# Patient Record
Sex: MALE | Race: WHITE | HISPANIC OR LATINO
[De-identification: names, ages, dates, MRNs, and addresses within clinical notes are randomized per-mention and may not be internally consistent; named-entity substitution may affect disease eponyms.]

---

## 2020-08-25 ENCOUNTER — TRANSCRIPTION ENCOUNTER (OUTPATIENT)
Age: 35
End: 2020-08-25

## 2020-08-26 ENCOUNTER — INPATIENT (INPATIENT)
Facility: HOSPITAL | Age: 35
LOS: 0 days | Discharge: HOME | DRG: 343 | End: 2020-08-27
Attending: SURGERY | Admitting: SURGERY
Payer: MEDICAID

## 2020-08-26 ENCOUNTER — RESULT REVIEW (OUTPATIENT)
Age: 35
End: 2020-08-26

## 2020-08-26 VITALS
DIASTOLIC BLOOD PRESSURE: 82 MMHG | RESPIRATION RATE: 18 BRPM | SYSTOLIC BLOOD PRESSURE: 143 MMHG | OXYGEN SATURATION: 99 % | WEIGHT: 168.43 LBS | HEIGHT: 68 IN | HEART RATE: 68 BPM | TEMPERATURE: 99 F

## 2020-08-26 LAB
ALBUMIN SERPL ELPH-MCNC: 5 G/DL — SIGNIFICANT CHANGE UP (ref 3.3–5)
ALP SERPL-CCNC: 88 U/L — SIGNIFICANT CHANGE UP (ref 40–120)
ALT FLD-CCNC: 12 U/L — SIGNIFICANT CHANGE UP (ref 10–45)
ANION GAP SERPL CALC-SCNC: 10 MMOL/L — SIGNIFICANT CHANGE UP (ref 5–17)
APTT BLD: 33.1 SEC — SIGNIFICANT CHANGE UP (ref 27.5–35.5)
AST SERPL-CCNC: 20 U/L — SIGNIFICANT CHANGE UP (ref 10–40)
BASOPHILS # BLD AUTO: 0.02 K/UL — SIGNIFICANT CHANGE UP (ref 0–0.2)
BASOPHILS NFR BLD AUTO: 0.2 % — SIGNIFICANT CHANGE UP (ref 0–2)
BILIRUB SERPL-MCNC: 0.7 MG/DL — SIGNIFICANT CHANGE UP (ref 0.2–1.2)
BLD GP AB SCN SERPL QL: NEGATIVE — SIGNIFICANT CHANGE UP
BUN SERPL-MCNC: 18 MG/DL — SIGNIFICANT CHANGE UP (ref 7–23)
CALCIUM SERPL-MCNC: 9.3 MG/DL — SIGNIFICANT CHANGE UP (ref 8.4–10.5)
CHLORIDE SERPL-SCNC: 103 MMOL/L — SIGNIFICANT CHANGE UP (ref 96–108)
CO2 SERPL-SCNC: 24 MMOL/L — SIGNIFICANT CHANGE UP (ref 22–31)
CREAT SERPL-MCNC: 0.76 MG/DL — SIGNIFICANT CHANGE UP (ref 0.5–1.3)
EOSINOPHIL # BLD AUTO: 0.01 K/UL — SIGNIFICANT CHANGE UP (ref 0–0.5)
EOSINOPHIL NFR BLD AUTO: 0.1 % — SIGNIFICANT CHANGE UP (ref 0–6)
GLUCOSE SERPL-MCNC: 136 MG/DL — HIGH (ref 70–99)
HCT VFR BLD CALC: 44.6 % — SIGNIFICANT CHANGE UP (ref 39–50)
HGB BLD-MCNC: 14.5 G/DL — SIGNIFICANT CHANGE UP (ref 13–17)
IMM GRANULOCYTES NFR BLD AUTO: 0.3 % — SIGNIFICANT CHANGE UP (ref 0–1.5)
INR BLD: 0.99 — SIGNIFICANT CHANGE UP (ref 0.88–1.16)
LIDOCAIN IGE QN: 16 U/L — SIGNIFICANT CHANGE UP (ref 7–60)
LYMPHOCYTES # BLD AUTO: 0.85 K/UL — LOW (ref 1–3.3)
LYMPHOCYTES # BLD AUTO: 6.6 % — LOW (ref 13–44)
MCHC RBC-ENTMCNC: 29.7 PG — SIGNIFICANT CHANGE UP (ref 27–34)
MCHC RBC-ENTMCNC: 32.5 GM/DL — SIGNIFICANT CHANGE UP (ref 32–36)
MCV RBC AUTO: 91.4 FL — SIGNIFICANT CHANGE UP (ref 80–100)
MONOCYTES # BLD AUTO: 0.57 K/UL — SIGNIFICANT CHANGE UP (ref 0–0.9)
MONOCYTES NFR BLD AUTO: 4.4 % — SIGNIFICANT CHANGE UP (ref 2–14)
NEUTROPHILS # BLD AUTO: 11.34 K/UL — HIGH (ref 1.8–7.4)
NEUTROPHILS NFR BLD AUTO: 88.4 % — HIGH (ref 43–77)
NRBC # BLD: 0 /100 WBCS — SIGNIFICANT CHANGE UP (ref 0–0)
PLATELET # BLD AUTO: 262 K/UL — SIGNIFICANT CHANGE UP (ref 150–400)
POTASSIUM SERPL-MCNC: 4.1 MMOL/L — SIGNIFICANT CHANGE UP (ref 3.5–5.3)
POTASSIUM SERPL-SCNC: 4.1 MMOL/L — SIGNIFICANT CHANGE UP (ref 3.5–5.3)
PROT SERPL-MCNC: 7.9 G/DL — SIGNIFICANT CHANGE UP (ref 6–8.3)
PROTHROM AB SERPL-ACNC: 11.9 SEC — SIGNIFICANT CHANGE UP (ref 10.6–13.6)
RBC # BLD: 4.88 M/UL — SIGNIFICANT CHANGE UP (ref 4.2–5.8)
RBC # FLD: 12.5 % — SIGNIFICANT CHANGE UP (ref 10.3–14.5)
RH IG SCN BLD-IMP: POSITIVE — SIGNIFICANT CHANGE UP
SARS-COV-2 RNA SPEC QL NAA+PROBE: SIGNIFICANT CHANGE UP
SODIUM SERPL-SCNC: 137 MMOL/L — SIGNIFICANT CHANGE UP (ref 135–145)
WBC # BLD: 12.83 K/UL — HIGH (ref 3.8–10.5)
WBC # FLD AUTO: 12.83 K/UL — HIGH (ref 3.8–10.5)

## 2020-08-26 PROCEDURE — 44970 LAPAROSCOPY APPENDECTOMY: CPT | Mod: GC

## 2020-08-26 PROCEDURE — 74177 CT ABD & PELVIS W/CONTRAST: CPT | Mod: 26

## 2020-08-26 PROCEDURE — 99232 SBSQ HOSP IP/OBS MODERATE 35: CPT | Mod: GC,57

## 2020-08-26 PROCEDURE — 88304 TISSUE EXAM BY PATHOLOGIST: CPT | Mod: 26

## 2020-08-26 PROCEDURE — 99285 EMERGENCY DEPT VISIT HI MDM: CPT

## 2020-08-26 RX ORDER — IOHEXOL 300 MG/ML
30 INJECTION, SOLUTION INTRAVENOUS ONCE
Refills: 0 | Status: COMPLETED | OUTPATIENT
Start: 2020-08-26 | End: 2020-08-26

## 2020-08-26 RX ORDER — MORPHINE SULFATE 50 MG/1
4 CAPSULE, EXTENDED RELEASE ORAL ONCE
Refills: 0 | Status: DISCONTINUED | OUTPATIENT
Start: 2020-08-26 | End: 2020-08-26

## 2020-08-26 RX ORDER — KETOROLAC TROMETHAMINE 30 MG/ML
15 SYRINGE (ML) INJECTION EVERY 6 HOURS
Refills: 0 | Status: DISCONTINUED | OUTPATIENT
Start: 2020-08-26 | End: 2020-08-27

## 2020-08-26 RX ORDER — HYDROMORPHONE HYDROCHLORIDE 2 MG/ML
0.5 INJECTION INTRAMUSCULAR; INTRAVENOUS; SUBCUTANEOUS EVERY 4 HOURS
Refills: 0 | Status: DISCONTINUED | OUTPATIENT
Start: 2020-08-26 | End: 2020-08-27

## 2020-08-26 RX ORDER — ACETAMINOPHEN 500 MG
650 TABLET ORAL EVERY 6 HOURS
Refills: 0 | Status: DISCONTINUED | OUTPATIENT
Start: 2020-08-26 | End: 2020-08-27

## 2020-08-26 RX ORDER — SODIUM CHLORIDE 9 MG/ML
1000 INJECTION INTRAMUSCULAR; INTRAVENOUS; SUBCUTANEOUS ONCE
Refills: 0 | Status: COMPLETED | OUTPATIENT
Start: 2020-08-26 | End: 2020-08-26

## 2020-08-26 RX ORDER — PIPERACILLIN AND TAZOBACTAM 4; .5 G/20ML; G/20ML
3.38 INJECTION, POWDER, LYOPHILIZED, FOR SOLUTION INTRAVENOUS ONCE
Refills: 0 | Status: DISCONTINUED | OUTPATIENT
Start: 2020-08-26 | End: 2020-08-26

## 2020-08-26 RX ORDER — ONDANSETRON 8 MG/1
4 TABLET, FILM COATED ORAL EVERY 6 HOURS
Refills: 0 | Status: DISCONTINUED | OUTPATIENT
Start: 2020-08-26 | End: 2020-08-27

## 2020-08-26 RX ORDER — CEFTRIAXONE 500 MG/1
1000 INJECTION, POWDER, FOR SOLUTION INTRAMUSCULAR; INTRAVENOUS ONCE
Refills: 0 | Status: COMPLETED | OUTPATIENT
Start: 2020-08-26 | End: 2020-08-26

## 2020-08-26 RX ORDER — METRONIDAZOLE 500 MG
500 TABLET ORAL EVERY 8 HOURS
Refills: 0 | Status: DISCONTINUED | OUTPATIENT
Start: 2020-08-26 | End: 2020-08-26

## 2020-08-26 RX ORDER — ENOXAPARIN SODIUM 100 MG/ML
40 INJECTION SUBCUTANEOUS EVERY 24 HOURS
Refills: 0 | Status: DISCONTINUED | OUTPATIENT
Start: 2020-08-26 | End: 2020-08-27

## 2020-08-26 RX ORDER — METRONIDAZOLE 500 MG
500 TABLET ORAL ONCE
Refills: 0 | Status: COMPLETED | OUTPATIENT
Start: 2020-08-26 | End: 2020-08-26

## 2020-08-26 RX ORDER — ONDANSETRON 8 MG/1
4 TABLET, FILM COATED ORAL ONCE
Refills: 0 | Status: COMPLETED | OUTPATIENT
Start: 2020-08-26 | End: 2020-08-26

## 2020-08-26 RX ORDER — FAMOTIDINE 10 MG/ML
20 INJECTION INTRAVENOUS ONCE
Refills: 0 | Status: COMPLETED | OUTPATIENT
Start: 2020-08-26 | End: 2020-08-26

## 2020-08-26 RX ORDER — SODIUM CHLORIDE 9 MG/ML
1000 INJECTION INTRAMUSCULAR; INTRAVENOUS; SUBCUTANEOUS
Refills: 0 | Status: DISCONTINUED | OUTPATIENT
Start: 2020-08-26 | End: 2020-08-27

## 2020-08-26 RX ADMIN — FAMOTIDINE 20 MILLIGRAM(S): 10 INJECTION INTRAVENOUS at 08:22

## 2020-08-26 RX ADMIN — SODIUM CHLORIDE 1000 MILLILITER(S): 9 INJECTION INTRAMUSCULAR; INTRAVENOUS; SUBCUTANEOUS at 11:18

## 2020-08-26 RX ADMIN — Medication 100 MILLIGRAM(S): at 11:19

## 2020-08-26 RX ADMIN — IOHEXOL 30 MILLILITER(S): 300 INJECTION, SOLUTION INTRAVENOUS at 08:59

## 2020-08-26 RX ADMIN — MORPHINE SULFATE 4 MILLIGRAM(S): 50 CAPSULE, EXTENDED RELEASE ORAL at 09:17

## 2020-08-26 RX ADMIN — CEFTRIAXONE 100 MILLIGRAM(S): 500 INJECTION, POWDER, FOR SOLUTION INTRAMUSCULAR; INTRAVENOUS at 11:19

## 2020-08-26 RX ADMIN — ONDANSETRON 4 MILLIGRAM(S): 8 TABLET, FILM COATED ORAL at 09:17

## 2020-08-26 RX ADMIN — Medication 15 MILLIGRAM(S): at 21:35

## 2020-08-26 RX ADMIN — Medication 15 MILLIGRAM(S): at 21:25

## 2020-08-26 RX ADMIN — MORPHINE SULFATE 4 MILLIGRAM(S): 50 CAPSULE, EXTENDED RELEASE ORAL at 10:15

## 2020-08-26 RX ADMIN — SODIUM CHLORIDE 1000 MILLILITER(S): 9 INJECTION INTRAMUSCULAR; INTRAVENOUS; SUBCUTANEOUS at 08:21

## 2020-08-26 NOTE — ED PROVIDER NOTE - OBJECTIVE STATEMENT
35 year old male with no past medical history on no medication presents to ED with concern for periumbilical abdominal discomfort x 24 hours.  Patient notes associated nausea without emesis.  No fever, chills, chest pain, shortness of breath, changes to bowel movements (last BM this morning and normal per patient), rashes, recent travel, known sick contacts or any additional acute complaints or concerns at this time.  He has not taken any medication for his symptoms prior to arrival in ED today.

## 2020-08-26 NOTE — H&P ADULT - ASSESSMENT
36 y/o M, no PMH, periumbilical pain x1d, vomiting x2, nausea, no f/c  WBC 12, CT scan showed appendicitis (non complicated)    Tender in RLQ, afebrile, received Rocephin and Flagyl by ED  Covid 19 is negative    Discussed with Chief resident and attending on call and explained the treatment options to the patient using interpretation and the patient is agreeable to surgery    Plan:    - Admit to regional  - NPO, IV fluids, SCD, Lovenox, ABX  - Typed and screened  - Booked for surgery for lap appendectomy

## 2020-08-26 NOTE — PACU DISCHARGE NOTE - COMMENTS
for transfer to [facility] bed 1, Pt AOx4, VSS,afebrile.02sat 99% on 2 LNC.  No sign of distress ir SOB noted. No c/o pain. Sx: laparoscopic appendectomy with 3 x lap site, no bleeding noted. IVL patent and intact, no infiltration or phlebitis noted. Report given to BRUNA Rea for transfer to [facility] bed 1, Pt AOx4, VSS,afebrile.02sat 99% on 2 LNC.  No sign of distress ir SOB noted. No c/o pain. Sx: laparoscopic appendectomy with 3 x lap site, no bleeding noted. IVL patent and intact, no infiltration or phlebitis noted. Report given to BRUNA Aamto

## 2020-08-26 NOTE — H&P ADULT - NSHPPHYSICALEXAM_GEN_ALL_CORE
Vital Signs Last 24 Hrs  T(C): 36.9 (26 Aug 2020 14:28), Max: 37.1 (26 Aug 2020 08:09)  T(F): 98.5 (26 Aug 2020 14:28), Max: 98.7 (26 Aug 2020 08:09)  HR: 61 (26 Aug 2020 14:28) (54 - 74)  BP: 147/82 (26 Aug 2020 14:28) (143/82 - 150/80)  BP(mean): --  RR: 17 (26 Aug 2020 14:28) (16 - 18)  SpO2: 99% (26 Aug 2020 14:28) (98% - 99%)    Gen: NAD, resting comfortably in bed  Pulm: Good inspiratory effort, nonlabored breathing  C/V: NSR  Abd: Soft, tender in RLQ, Rovsing is +ve nondistended. No rebound, no guarding.   Extrem: WWP, no edema

## 2020-08-26 NOTE — H&P ADULT - NSHPLABSRESULTS_GEN_ALL_CORE
14.5   12.83 )-----------( 262      ( 26 Aug 2020 08:26 )             44.6   08-26    137  |  103  |  18  ----------------------------<  136<H>  4.1   |  24  |  0.76    Ca    9.3      26 Aug 2020 08:26    TPro  7.9  /  Alb  5.0  /  TBili  0.7  /  DBili  x   /  AST  20  /  ALT  12  /  AlkPhos  88  08-26    COVID-19 PCR: NotDetec (26 Aug 2020 11:58)  < from: CT Abdomen and Pelvis w/ Oral Cont and w/ IV Cont (08.26.20 @ 10:34) >    FINDINGS:    Lower chest: Clear lung bases.    Liver: Smooth in contour. No focal lesion. Portal and hepatic veins are patent.    Biliary system: Gallbladder is normal in size. No calcified gallstones. No biliary ductal dilatation.    Pancreas: Unremarkable.    Spleen: Unremarkable.    Adrenal glands: Unremarkable.    Kidneys: Symmetric parenchymal enhancement. No renal mass. No hydronephrosis. No renal or ureteral stone.  Urinary Bladder: Unremarkable.    Reproductive organs: Unremarkable.    Bowel/Peritoneum: Dilated appendix 1.4 cm with periappendiceal stranding and 1.1 cm appendicolith at appendiceal neck. Appendix contains air bubble and debris. No adjacent organized collection. No pneumoperitoneum. Trace dependent pelvic free fluid, probably reactive. Normal caliber bowel without evidence of obstruction. Colonic diverticulosis. No appreciable wall thickening.    Lymph nodes: Few scattered mildly prominent right lower quadrant and retroperitoneal nodes, probably reactive. No lymphadenopathy.    Aorta/IVC: Normal caliber. No calcific atherosclerosis.    Abdominal wall: No hernia.    Bones/Soft tissues: No significant abnormality.      IMPRESSION:    1.  Acute appendicitis with obstructing appendicolith. No pneumoperitoneum or adjacent organized collection.  2.  Colonic diverticulosis.    < end of copied text >

## 2020-08-26 NOTE — H&P ADULT - ATTENDING COMMENTS
Patient seen and examined. History, physical examination and imaging are consistent with acute appendicitis. We discuss the risks, benefits and alternatives to laparoscopic appendectomy with the patient including but not limited to bleeding, infection, death, disability, chronic pain, numbness, abscess, leak, need for additional procedures, blood clots, death, bleeding, and other issues. Antibiotic therapy unlikely to work based on CT findings. Will proceed to operative room for procedure appendectomy. I answered all questions.

## 2020-08-26 NOTE — PROGRESS NOTE ADULT - SUBJECTIVE AND OBJECTIVE BOX
POST-OPERATIVE NOTE    Procedure: laparoscopic appendectomy    Diagnosis/Indication: acute appendicitis    Surgeon: Dr. Mercer    S: Pt has no complaints, lying comfortably in bed. He reports that he is having only a little pain. He had water and tolerated it well, denies nausea/vomiting. Denies CP, SOB, IVEY, calf tenderness. Pain controlled with medication.    O:  T(C): 37.1 (08-26-20 @ 20:44), Max: 37.1 (08-26-20 @ 20:44)  T(F): 98.7 (08-26-20 @ 20:44), Max: 98.7 (08-26-20 @ 20:44)  HR: 85 (08-26-20 @ 20:44) (64 - 85)  BP: 137/79 (08-26-20 @ 20:44) (117/63 - 137/79)  RR: 18 (08-26-20 @ 20:44) (17 - 21)  SpO2: 97% (08-26-20 @ 20:44) (97% - 100%)  Wt(kg): --                        14.5   12.83 )-----------( 262      ( 26 Aug 2020 08:26 )             44.6     08-26    137  |  103  |  18  ----------------------------<  136<H>  4.1   |  24  |  0.76    Ca    9.3      26 Aug 2020 08:26    TPro  7.9  /  Alb  5.0  /  TBili  0.7  /  DBili  x   /  AST  20  /  ALT  12  /  AlkPhos  88  08-26      Gen: NAD, resting comfortably in bed  C/V: NSR  Pulm: Nonlabored breathing, no respiratory distress  Abd: soft, ND, appropriately tender to palpation, mild pain   Incisions: clean/dry/intact, w dermabond  Extrem: WWP, no calf edema or tenderness, SCDs in place      A/P: 35y Male s/p above procedure  Diet: Regular  IVF: NS@110  Pain/nausea control  SQH/SCDs/OOBA/IS  Dispo pending pain control, PO tolerance, clinical improvement

## 2020-08-26 NOTE — BRIEF OPERATIVE NOTE - OPERATION/FINDINGS
Appendix identified (non-perforated, non-gangrenous). Cecum bluntly mobilized. Mesoappendix divided using electrocautery. Appendix amputated at base near cecum using EndoGIA stapler. Stump inspected laparoscopically. Fascia closed w/ Vicryl sutures. Appendix identified (non-perforated, inflamed). Cecum bluntly mobilized. Mesoappendix divided using electrocautery. Appendix amputated at base near cecum using EndoGIA stapler. Stump inspected laparoscopically. Irrigation performed. Fascia closed w/ Vicryl sutures. Skin closed with 4-O monocryl.

## 2020-08-26 NOTE — ED PROVIDER NOTE - CLINICAL SUMMARY MEDICAL DECISION MAKING FREE TEXT BOX
Patient presents to ED with concern for periumbilical pain.  Labs CT abd/pel reviewed.  Acute appendicitis detected.  Abx given, surgery consulted.  Surgery agreeable to admission to regional floor, Dr. Mercer.  Patient is aware of plan and in agreement.  Will admit at this time.

## 2020-08-26 NOTE — H&P ADULT - HISTORY OF PRESENT ILLNESS
This is a 36 y/o M, [place] speaking only, no significant PMH presented with periumbilical abdominal pain of 1 d duration, associated with nausea and vomiting x2, pain is severe, sharp and aggravated by movement, not shifting or radiating, denies fever or chills no recent weight loss, no change in bowel habits, no recent travel or sick contact.    PMH: denies  PSH: denies  FMH: denies  SH: denies tobacco and drugs,. admits to occasional drinking etoh  Allergies: NKDA

## 2020-08-26 NOTE — ED PROVIDER NOTE - PHYSICAL EXAMINATION
VITAL SIGNS: I have reviewed nursing notes and confirm.  CONSTITUTIONAL: Well-developed; well-nourished; in no acute distress.   SKIN:  warm and dry, no acute rash.   HEAD:  normocephalic, atraumatic.  EYES: PERRL.  EOM intact; conjunctiva and sclera clear.  ENT: No nasal discharge; airway clear.   NECK: Supple; non tender.  CARD: S1, S2 normal; no murmurs, gallops, or rubs. Regular rate and rhythm.   RESP:  Clear to auscultation b/l, no wheezes, rales or rhonchi.  ABD: Normal bowel sounds; soft; + mild periumbilical tenderness; soft; no guarding/rebound.  EXT: Normal ROM. No clubbing, cyanosis or edema. 2+ pulses to b/l ue/le.  NEURO: Alert, oriented, grossly unremarkable.  5/5 strength x 4 extremities against gravity and external force.  No drift x 4 extremities.  Sensation intact and symmetric x 4 extremities.  No facial asymmetry.    PSYCH: Cooperative, mood and affect appropriate.

## 2020-08-26 NOTE — ED PROVIDER NOTE - NS ED ROS FT
Constitutional: No fever or chills.   Eyes: No pain, blurry vision, or discharge.  ENMT: No hearing changes, pain, discharge or infections. No neck pain or stiffness.  Cardiac: No chest pain, SOB or edema. No chest pain with exertion.  Respiratory: No cough or respiratory distress. No hemoptysis. No history of asthma or RAD.  GI: + nausea,  no vomiting, diarrhea, + abdominal pain.  : No dysuria, frequency or burning.  MS: No myalgia, muscle weakness, joint pain or back pain.  Neuro: No headache or weakness. No LOC.  Skin: No skin rash.   Endocrine: No history of thyroid disease or diabetes.  Except as documented in the HPI, all other systems are negative.

## 2020-08-26 NOTE — ED ADULT NURSE NOTE - CHPI ED NUR SYMPTOMS NEG
no abdominal distension/no burning urination/no chills/no diarrhea/no fever/no vomiting/no blood in stool/no hematuria/no dysuria

## 2020-08-27 ENCOUNTER — TRANSCRIPTION ENCOUNTER (OUTPATIENT)
Age: 35
End: 2020-08-27

## 2020-08-27 VITALS
DIASTOLIC BLOOD PRESSURE: 80 MMHG | HEART RATE: 71 BPM | SYSTOLIC BLOOD PRESSURE: 132 MMHG | OXYGEN SATURATION: 98 % | TEMPERATURE: 98 F | RESPIRATION RATE: 15 BRPM

## 2020-08-27 LAB
ANION GAP SERPL CALC-SCNC: 11 MMOL/L — SIGNIFICANT CHANGE UP (ref 5–17)
CALCIUM SERPL-MCNC: 8.5 MG/DL — SIGNIFICANT CHANGE UP (ref 8.4–10.5)
CHLORIDE SERPL-SCNC: 103 MMOL/L — SIGNIFICANT CHANGE UP (ref 96–108)
CO2 SERPL-SCNC: 26 MMOL/L — SIGNIFICANT CHANGE UP (ref 22–31)
CREAT SERPL-MCNC: 0.76 MG/DL — SIGNIFICANT CHANGE UP (ref 0.5–1.3)
GLUCOSE SERPL-MCNC: 118 MG/DL — HIGH (ref 70–99)
HCT VFR BLD CALC: 39.7 % — SIGNIFICANT CHANGE UP (ref 39–50)
HGB BLD-MCNC: 12.9 G/DL — LOW (ref 13–17)
MAGNESIUM SERPL-MCNC: 2.1 MG/DL — SIGNIFICANT CHANGE UP (ref 1.6–2.6)
MCHC RBC-ENTMCNC: 29.9 PG — SIGNIFICANT CHANGE UP (ref 27–34)
MCHC RBC-ENTMCNC: 32.5 GM/DL — SIGNIFICANT CHANGE UP (ref 32–36)
MCV RBC AUTO: 92.1 FL — SIGNIFICANT CHANGE UP (ref 80–100)
NRBC # BLD: 0 /100 WBCS — SIGNIFICANT CHANGE UP (ref 0–0)
PHOSPHATE SERPL-MCNC: 3.3 MG/DL — SIGNIFICANT CHANGE UP (ref 2.5–4.5)
PLATELET # BLD AUTO: 233 K/UL — SIGNIFICANT CHANGE UP (ref 150–400)
POTASSIUM SERPL-MCNC: 3.8 MMOL/L — SIGNIFICANT CHANGE UP (ref 3.5–5.3)
POTASSIUM SERPL-SCNC: 3.8 MMOL/L — SIGNIFICANT CHANGE UP (ref 3.5–5.3)
RBC # BLD: 4.31 M/UL — SIGNIFICANT CHANGE UP (ref 4.2–5.8)
RBC # FLD: 12.9 % — SIGNIFICANT CHANGE UP (ref 10.3–14.5)
SODIUM SERPL-SCNC: 140 MMOL/L — SIGNIFICANT CHANGE UP (ref 135–145)
WBC # BLD: 12.24 K/UL — HIGH (ref 3.8–10.5)
WBC # FLD AUTO: 12.24 K/UL — HIGH (ref 3.8–10.5)

## 2020-08-27 RX ORDER — ACETAMINOPHEN 500 MG
2 TABLET ORAL
Qty: 0 | Refills: 0 | DISCHARGE
Start: 2020-08-27

## 2020-08-27 RX ORDER — POTASSIUM CHLORIDE 20 MEQ
20 PACKET (EA) ORAL ONCE
Refills: 0 | Status: DISCONTINUED | OUTPATIENT
Start: 2020-08-27 | End: 2020-08-27

## 2020-08-27 RX ADMIN — Medication 15 MILLIGRAM(S): at 07:05

## 2020-08-27 RX ADMIN — Medication 15 MILLIGRAM(S): at 07:20

## 2020-08-27 NOTE — DISCHARGE NOTE PROVIDER - CARE PROVIDER_API CALL
Salvador Mercer  SURGERY  [address] [place] [address], [facility], [place] [id number]  Phone: [phone]  Fax: [phone]  Follow Up Time: 1 week

## 2020-08-27 NOTE — PROGRESS NOTE ADULT - ASSESSMENT
ASSESSMENT  35 year old Citizen of [place] speaking man now POD1 from noncomplicated lap appendectomy.    PLAN  -Follow ROBF; flatus but no bowel movement yet  -Follow pain control  -Advance diet as tolerated  -Not on antibiotics

## 2020-08-27 NOTE — DISCHARGE NOTE NURSING/CASE MANAGEMENT/SOCIAL WORK - PATIENT PORTAL LINK FT
You can access the FollowMyHealth Patient Portal offered by [facility] by registering at the following website: [email or web address]. By joining [facility]’s FollowMyHealth portal, you will also be able to view your health information using other applications (apps) compatible with our system.

## 2020-08-27 NOTE — PROGRESS NOTE ADULT - SUBJECTIVE AND OBJECTIVE BOX
STATUS POST:  POD1 from laparoscopic appendectomy (noncomplicated)     SUBJECTIVE: Patient seen and examined bedside by surgery team. Reports that he is doing well overall without any nausea/vomiting. Has urinated and passed TOV. Tolerating clear liquid diet well. Pain well controlled. Passed flatus but no BM.    enoxaparin Injectable 40 milliGRAM(s) SubCutaneous every 24 hours      Vital Signs Last 24 Hrs  T(C): 36.7 (27 Aug 2020 04:34), Max: 37.1 (26 Aug 2020 08:09)  T(F): 98 (27 Aug 2020 04:34), Max: 98.7 (26 Aug 2020 08:09)  HR: 77 (27 Aug 2020 04:34) (54 - 85)  BP: 100/58 (27 Aug 2020 04:34) (100/58 - 150/80)  BP(mean): 84 (26 Aug 2020 19:20) (84 - 93)  RR: 15 (27 Aug 2020 04:34) (15 - 21)  SpO2: 97% (27 Aug 2020 04:34) (96% - 100%)  I&O's Detail    26 Aug 2020 07:01  -  27 Aug 2020 06:51  --------------------------------------------------------  IN:    Lactated Ringers IV Bolus: 500 mL    sodium chloride 0.9%.: 1320 mL  Total IN: 1820 mL    OUT:    Estimated Blood Loss: 20 mL    Voided: 1400 mL  Total OUT: 1420 mL    Total NET: 400 mL          Physical Exam:  General: No acute distress, resting comfortably in bed  C/V: normal sinus rhythm  Pulm: Nonlabored breathing, no respiratory distress  Abd: soft, non-tender, non-distended, incisions clean/dry/intact. No swelling or erythema surrounding incision site.  Extrem: warm and well perfused, no edema.    LABS:                        14.5   12.83 )-----------( 262      ( 26 Aug 2020 08:26 )             44.6     08-26    137  |  103  |  18  ----------------------------<  136<H>  4.1   |  24  |  0.76    Ca    9.3      26 Aug 2020 08:26    TPro  7.9  /  Alb  5.0  /  TBili  0.7  /  DBili  x   /  AST  20  /  ALT  12  /  AlkPhos  88  08-26    PT/INR - ( 26 Aug 2020 11:39 )   PT: 11.9 sec;   INR: 0.99          PTT - ( 26 Aug 2020 11:39 )  PTT:33.1 sec      RADIOLOGY & ADDITIONAL STUDIES:

## 2020-08-28 PROCEDURE — C1889: CPT

## 2020-08-28 PROCEDURE — 80053 COMPREHEN METABOLIC PANEL: CPT

## 2020-08-28 PROCEDURE — 96375 TX/PRO/DX INJ NEW DRUG ADDON: CPT

## 2020-08-28 PROCEDURE — 88304 TISSUE EXAM BY PATHOLOGIST: CPT

## 2020-08-28 PROCEDURE — 96374 THER/PROPH/DIAG INJ IV PUSH: CPT | Mod: XU

## 2020-08-28 PROCEDURE — 86901 BLOOD TYPING SEROLOGIC RH(D): CPT

## 2020-08-28 PROCEDURE — 85610 PROTHROMBIN TIME: CPT

## 2020-08-28 PROCEDURE — 83735 ASSAY OF MAGNESIUM: CPT

## 2020-08-28 PROCEDURE — 85027 COMPLETE CBC AUTOMATED: CPT

## 2020-08-28 PROCEDURE — 87635 SARS-COV-2 COVID-19 AMP PRB: CPT

## 2020-08-28 PROCEDURE — 80048 BASIC METABOLIC PNL TOTAL CA: CPT

## 2020-08-28 PROCEDURE — 74177 CT ABD & PELVIS W/CONTRAST: CPT

## 2020-08-28 PROCEDURE — 36415 COLL VENOUS BLD VENIPUNCTURE: CPT

## 2020-08-28 PROCEDURE — 83690 ASSAY OF LIPASE: CPT

## 2020-08-28 PROCEDURE — 99285 EMERGENCY DEPT VISIT HI MDM: CPT | Mod: 25

## 2020-08-28 PROCEDURE — 85025 COMPLETE CBC W/AUTO DIFF WBC: CPT

## 2020-08-28 PROCEDURE — 86850 RBC ANTIBODY SCREEN: CPT

## 2020-08-28 PROCEDURE — 85730 THROMBOPLASTIN TIME PARTIAL: CPT

## 2020-08-28 PROCEDURE — 84100 ASSAY OF PHOSPHORUS: CPT

## 2020-08-31 LAB — SURGICAL PATHOLOGY STUDY: SIGNIFICANT CHANGE UP

## 2020-09-01 DIAGNOSIS — Z11.59 ENCOUNTER FOR SCREENING FOR OTHER VIRAL DISEASES: ICD-10-CM

## 2020-09-01 DIAGNOSIS — K57.30 DIVERTICULOSIS OF LARGE INTESTINE WITHOUT PERFORATION OR ABSCESS WITHOUT BLEEDING: ICD-10-CM

## 2020-09-01 DIAGNOSIS — K35.30 ACUTE APPENDICITIS WITH LOCALIZED PERITONITIS, WITHOUT PERFORATION OR GANGRENE: ICD-10-CM

## 2020-09-02 PROBLEM — Z00.00 ENCOUNTER FOR PREVENTIVE HEALTH EXAMINATION: Status: ACTIVE | Noted: 2020-09-02

## 2020-09-02 PROBLEM — Z78.9 OTHER SPECIFIED HEALTH STATUS: Chronic | Status: ACTIVE | Noted: 2020-08-26

## 2020-09-14 ENCOUNTER — APPOINTMENT (OUTPATIENT)
Dept: SURGERY | Facility: CLINIC | Age: 35
End: 2020-09-14
Payer: MEDICAID

## 2020-09-14 VITALS
BODY MASS INDEX: 25.94 KG/M2 | HEART RATE: 73 BPM | TEMPERATURE: 98.2 F | SYSTOLIC BLOOD PRESSURE: 145 MMHG | HEIGHT: 68 IN | DIASTOLIC BLOOD PRESSURE: 86 MMHG | OXYGEN SATURATION: 99 % | WEIGHT: 171.19 LBS

## 2020-09-14 DIAGNOSIS — Z87.19 PERSONAL HISTORY OF OTHER DISEASES OF THE DIGESTIVE SYSTEM: ICD-10-CM

## 2020-09-14 DIAGNOSIS — Z78.9 OTHER SPECIFIED HEALTH STATUS: ICD-10-CM

## 2020-09-14 PROCEDURE — 99024 POSTOP FOLLOW-UP VISIT: CPT

## 2020-09-17 PROBLEM — Z87.19 HISTORY OF APPENDICITIS: Status: RESOLVED | Noted: 2020-09-14 | Resolved: 2020-09-17

## 2020-09-17 NOTE — PLAN
[FreeTextEntry1] : Doing well s/p laparoscopic appendectomy. We discussed natural scar maturation and gradual return to normal activity. I answered all questions.

## 2020-09-17 NOTE — HISTORY OF PRESENT ILLNESS
[de-identified] : 35 year old male. Comes to the office s/p laparoscopic appendectomy. Doing well. He denies any fevers, chills or shortness of breath. Eating and drinking without issue.

## 2020-09-17 NOTE — PHYSICAL EXAM
[JVD] : no jugular venous distention  [Normal Breath Sounds] : Normal breath sounds [Normal Heart Sounds] : normal heart sounds [Abdominal Masses] : No abdominal masses [Abdomen Tenderness] : ~T ~M No abdominal tenderness [Tender] : nontender [Enlarged] : not enlarged [de-identified] : MARCIO. Rivera. Appropriate. Comfortable. [de-identified] : Pupils equal. No Scleral Icterus. NCAT. [de-identified] : Supple. Trachea midline. No overt lymphadenopathy. No JVD [de-identified] : Abdomen is soft, non tender and non distended. Do not appreciate any overt mass. No overt hernia detected. The incisions are healing well. No evidence of hernia.